# Patient Record
Sex: FEMALE | HISPANIC OR LATINO | URBAN - METROPOLITAN AREA
[De-identification: names, ages, dates, MRNs, and addresses within clinical notes are randomized per-mention and may not be internally consistent; named-entity substitution may affect disease eponyms.]

---

## 2017-12-15 ENCOUNTER — APPOINTMENT (RX ONLY)
Dept: URBAN - METROPOLITAN AREA CLINIC 12 | Facility: CLINIC | Age: 32
Setting detail: DERMATOLOGY
End: 2017-12-15

## 2017-12-15 PROCEDURE — 99212 OFFICE O/P EST SF 10 MIN: CPT

## 2017-12-22 ENCOUNTER — APPOINTMENT (RX ONLY)
Dept: URBAN - METROPOLITAN AREA CLINIC 12 | Facility: CLINIC | Age: 32
Setting detail: DERMATOLOGY
End: 2017-12-22

## 2017-12-22 DIAGNOSIS — I83.9 ASYMPTOMATIC VARICOSE VEINS OF LOWER EXTREMITIES: ICD-10-CM

## 2017-12-22 PROBLEM — I83.93 ASYMPTOMATIC VARICOSE VEINS OF BILATERAL LOWER EXTREMITIES: Status: ACTIVE | Noted: 2017-12-22

## 2017-12-22 PROCEDURE — ? SCLEROTHERAPY (COSMETIC)

## 2017-12-22 ASSESSMENT — LOCATION SIMPLE DESCRIPTION DERM
LOCATION SIMPLE: RIGHT PRETIBIAL REGION
LOCATION SIMPLE: RIGHT THIGH
LOCATION SIMPLE: LEFT PRETIBIAL REGION

## 2017-12-22 ASSESSMENT — LOCATION DETAILED DESCRIPTION DERM
LOCATION DETAILED: RIGHT ANTERIOR DISTAL THIGH
LOCATION DETAILED: RIGHT ANTERIOR PROXIMAL THIGH
LOCATION DETAILED: LEFT PROXIMAL PRETIBIAL REGION
LOCATION DETAILED: RIGHT PROXIMAL PRETIBIAL REGION

## 2017-12-22 ASSESSMENT — LOCATION ZONE DERM: LOCATION ZONE: LEG

## 2017-12-22 NOTE — PROCEDURE: SCLEROTHERAPY (COSMETIC)
Sclerosant Volume (Cc): 10
Disposition: Compression stockings and short stretch elastic bandages were placed postoperatively.
Price (Use Numbers Only, No Special Characters Or $): 100
Post-Care Instructions: Compression for 3 weeks is critical to optimize your recovery and results. Compliance with compression helps to prevent blood clots and minimizes pain, swelling, bruising, skin discoloration (staining) and the recurrence of vessels.       \nMaterials to gather for your wound care (all available over the counter)      \nCompression stockings x 2 pairs, 4 x 4 gauze, Comprilan wrap: 8 cm and 10 cm width wrap, Medical adhesive tape       \nCompression and Wound care;  Leg compression for 3 weeks is rea to your success and safety. Compression at night is only needed the first day. After that, compression is needed only during waking hours.  However, if your leg feels better with compression at night, then you may continue compression at night as tolerated.       \nAfter the sclerotherapy procedure, 2 layers compression will be placed.       \n1. On the skin, folded or flat gauze will cover the treated areas.       \n2. A compression wrap (Comprilan) will be wrapped around your leg over the gauze. Once the compression wrap is in place, a compression stocking will be worn. This two layer  compression (wrap plus stocking) should be worn for the first 24 hours if tolerated.       \n3. After 24 hours, remove all compressions and dressings and just wear the compression stockings only during waking hours.        \nYou will need to wear compression stockings for three weeks after your procedure, unless your physician instructs you otherwise.       \nActivity       \n - It is important NOT to be sitting or lying down for several hours after surgery. You may begin walking immediately after surgery. This is good for you, but take it easy.       \n - For 2 days after treatment: Avoid aerobic exercise, weight training, and all other types of exertion that increase your breathing and pulse rates.       \n - Do not get a tan for one month after sclerotherapy. Tanning increases your risk of skin discoloration.      \nBathing       \nAfter 24 hours, you may shower or bathe in tepid water, but keep the compression stocking on. Avoid immersion in hot tubs.      \nDiscomfort . For pain or discomfort:       \n - You may take acetaminophen (TylenolTM, Extra-Strength TylenolTM or DatrilTM) as directed.       \n - Do not use aspirin, products containing aspirin, or ibuprofen (for example AdvilTM or MotrinTM) for five days after your surgery, unless approved by your physician.       \n - Dietary restrictions Do not drink alcoholic beverages for two days after your sclerotherapy procedure.       \nPossible side effects following sclerotherapy  After sclerotherapy, mild swelling is expected. The injection sites may also become bruised or gray. You may also experience one or more of the following side effects, which almost always go away within one to four months:       \n - Darkening of the injected veins       \n - Brownish staining of the skin       \n - Small clotted vessels under the surface of the skin that you can feel      \n - Bruising of the injection sites       \nWhat to do about bruising  This will resolve within 2-3 weeks. If you wish the bruising to disappear sooner, then applying Arnicare cream (over the counter, health food stores) will help.       \nWhat to do if you feel small clotted vessels under the surface of the skin.      \n - Call us for a follow up appointment. These small clots can be drained through a small nick.       \n - Draining these small clots will help you heal faster and with less discoloration.      \nWhen to contact your physician       \nContact your physician if you experience any of the following:       \n - Treated areas become increasingly sore, tender, red or warm       \n - Acetaminophen does not relieve your discomfort       \n - Injection sites turn black or the skin around the site breaks down       \n - Ulceration of the injection sites       \n - You develop blisters from the tape       \n - You develop significant swelling or pain in the leg       \n - Darkening of large areas of the skin or foot
Consent was obtained with risks, benefits, and alternatives discussed for the above procedures.  Photographs were taken.
Detail Level: Detailed

## 2018-04-03 ENCOUNTER — APPOINTMENT (RX ONLY)
Dept: URBAN - METROPOLITAN AREA CLINIC 12 | Facility: CLINIC | Age: 33
Setting detail: DERMATOLOGY
End: 2018-04-03

## 2018-04-03 DIAGNOSIS — I83.9 ASYMPTOMATIC VARICOSE VEINS OF LOWER EXTREMITIES: ICD-10-CM

## 2018-04-03 PROBLEM — I83.93 ASYMPTOMATIC VARICOSE VEINS OF BILATERAL LOWER EXTREMITIES: Status: ACTIVE | Noted: 2018-04-03

## 2018-04-03 PROCEDURE — ? SCLEROTHERAPY (COSMETIC)

## 2018-04-03 ASSESSMENT — LOCATION SIMPLE DESCRIPTION DERM
LOCATION SIMPLE: RIGHT THIGH
LOCATION SIMPLE: LEFT KNEE
LOCATION SIMPLE: LEFT THIGH
LOCATION SIMPLE: RIGHT LOWER LEG

## 2018-04-03 ASSESSMENT — LOCATION ZONE DERM: LOCATION ZONE: LEG

## 2018-04-03 ASSESSMENT — LOCATION DETAILED DESCRIPTION DERM
LOCATION DETAILED: LEFT ANTERIOR DISTAL THIGH
LOCATION DETAILED: RIGHT POSTERIOR LATERAL DISTAL THIGH
LOCATION DETAILED: RIGHT LATERAL PROXIMAL CALF
LOCATION DETAILED: LEFT KNEE

## 2018-10-29 ENCOUNTER — APPOINTMENT (RX ONLY)
Dept: URBAN - METROPOLITAN AREA CLINIC 12 | Facility: CLINIC | Age: 33
Setting detail: DERMATOLOGY
End: 2018-10-29

## 2018-10-29 DIAGNOSIS — Z41.1 ENCOUNTER FOR COSMETIC SURGERY: ICD-10-CM

## 2018-10-29 DIAGNOSIS — E88.1 LIPODYSTROPHY, NOT ELSEWHERE CLASSIFIED: ICD-10-CM

## 2018-10-29 PROCEDURE — ? OTHER

## 2018-10-29 PROCEDURE — ? CONSULTATION - LIPOSUCTION

## 2018-10-29 PROCEDURE — ? CONSULTATION - BREAST (COSMETIC)

## 2018-10-29 PROCEDURE — ? PATIENT SPECIFIC COUNSELING

## 2018-10-29 ASSESSMENT — LOCATION DETAILED DESCRIPTION DERM
LOCATION DETAILED: RIGHT PROXIMAL POSTERIOR THIGH
LOCATION DETAILED: LEFT PROXIMAL POSTERIOR THIGH

## 2018-10-29 ASSESSMENT — LOCATION ZONE DERM: LOCATION ZONE: LEG

## 2018-10-29 ASSESSMENT — LOCATION SIMPLE DESCRIPTION DERM
LOCATION SIMPLE: RIGHT POSTERIOR THIGH
LOCATION SIMPLE: LEFT POSTERIOR THIGH

## 2018-10-29 NOTE — HPI: BREAST (AESTHETIC DEFORMITY, BREAST)
Are You Contemplating Future Pregnancy?: No
Which Breast(S) Are You Concerned About?: bilateral breasts
Which Side(S)?: both breasts
How Severe Are Your Concerns?: moderate
Is This A New Presentation, Or A Follow-Up?: Breast (Aesthetic Breast Deformity)
Referral Source: Dr. Jeff khan
How Many Pregnancies Have You Had (Female Patients)?: 2

## 2018-10-29 NOTE — PROCEDURE: PATIENT SPECIFIC COUNSELING
Other (Free Text): Examined patients breasts, her implants are sub muscular.\\nPatient states that she does not like bulging on the sides of her breasts, from the implants. Explained to patient that a high profile, a narrower and taller implant can be placed, to correct the bulging. \\nAlso discussed with patient about placing a Meso graft, to help breast stay lifted. \\nPatient states that she wants to keep the same 400cc size.\\nDiscussed with patient that the same incisions will be used. Also explained to patient that she would also benefit from a small breast lift.
Detail Level: Detailed
Other (Free Text): Examined patients posterior thighs. Patient has bilateral banana rolls under her buttocks.\\nDiscussed with patient that liposuction can be done to treat areas. \\nPatient should expect some bruising and swelling. Also discussed with patient that she will wear bicycle shorts to help her thighs take shape.

## 2018-12-18 ENCOUNTER — APPOINTMENT (RX ONLY)
Dept: URBAN - METROPOLITAN AREA CLINIC 12 | Facility: CLINIC | Age: 33
Setting detail: DERMATOLOGY
End: 2018-12-18

## 2018-12-18 DIAGNOSIS — E88.1 LIPODYSTROPHY, NOT ELSEWHERE CLASSIFIED: ICD-10-CM

## 2018-12-18 DIAGNOSIS — Z41.1 ENCOUNTER FOR COSMETIC SURGERY: ICD-10-CM

## 2018-12-18 PROCEDURE — ? NO CHARGE PRE-OP VISIT

## 2018-12-18 PROCEDURE — ? PRE-OP WORKLIST

## 2018-12-18 PROCEDURE — 99024 POSTOP FOLLOW-UP VISIT: CPT

## 2018-12-18 ASSESSMENT — LOCATION DETAILED DESCRIPTION DERM
LOCATION DETAILED: RIGHT ANTERIOR LATERAL PROXIMAL THIGH
LOCATION DETAILED: RIGHT PROXIMAL POSTERIOR THIGH
LOCATION DETAILED: LEFT PROXIMAL POSTERIOR THIGH
LOCATION DETAILED: LEFT MEDIAL BREAST 8-9:00 REGION
LOCATION DETAILED: LEFT MEDIAL BREAST 9-10:00 REGION
LOCATION DETAILED: RIGHT MEDIAL BREAST 3-4:00 REGION
LOCATION DETAILED: LEFT ANTERIOR LATERAL PROXIMAL THIGH
LOCATION DETAILED: RIGHT MEDIAL BREAST 2-3:00 REGION

## 2018-12-18 ASSESSMENT — LOCATION SIMPLE DESCRIPTION DERM
LOCATION SIMPLE: RIGHT POSTERIOR THIGH
LOCATION SIMPLE: RIGHT THIGH
LOCATION SIMPLE: RIGHT BREAST
LOCATION SIMPLE: LEFT THIGH
LOCATION SIMPLE: LEFT BREAST
LOCATION SIMPLE: LEFT POSTERIOR THIGH

## 2018-12-18 ASSESSMENT — LOCATION ZONE DERM
LOCATION ZONE: TRUNK
LOCATION ZONE: LEG

## 2018-12-18 NOTE — PROCEDURE: PRE-OP WORKLIST
Surgeon: Dr. Sal
Surgery Scheduled: Liposuction Bananna rolls and outer thighs, mastopexy , breast implant exchange.
Date Of Surgery: 12/27/2018
Detail Level: Simple

## 2018-12-18 NOTE — PROCEDURE: NO CHARGE PRE-OP VISIT
For Tracking Purposes Only. Note 96807 Is Retired Code. Use 02082 Or 88388?: 04207
Detail Level: Detailed

## 2019-01-07 ENCOUNTER — APPOINTMENT (RX ONLY)
Dept: URBAN - METROPOLITAN AREA CLINIC 12 | Facility: CLINIC | Age: 34
Setting detail: DERMATOLOGY
End: 2019-01-07

## 2019-01-07 DIAGNOSIS — Z48.89 ENCOUNTER FOR OTHER SPECIFIED SURGICAL AFTERCARE: ICD-10-CM

## 2019-01-07 PROCEDURE — ? COUNSELING - POST-OP CHECK, LIPOSUCTION

## 2019-01-07 PROCEDURE — ? COUNSELING - POST-OP CHECK, BREAST AUGMENTATION

## 2019-01-07 PROCEDURE — 99024 POSTOP FOLLOW-UP VISIT: CPT

## 2019-01-07 PROCEDURE — ? POST-OP CHECK

## 2019-01-07 PROCEDURE — ? PATIENT SPECIFIC COUNSELING

## 2019-01-07 ASSESSMENT — LOCATION ZONE DERM
LOCATION ZONE: LEG
LOCATION ZONE: TRUNK

## 2019-01-07 ASSESSMENT — LOCATION DETAILED DESCRIPTION DERM
LOCATION DETAILED: LEFT ANTERIOR LATERAL PROXIMAL THIGH
LOCATION DETAILED: RIGHT PERIAREOLAR BREAST 6-7:00 REGION
LOCATION DETAILED: LEFT PERIAREOLAR BREAST 5-6:00 REGION
LOCATION DETAILED: RIGHT ANTERIOR PROXIMAL THIGH
LOCATION DETAILED: LEFT ANTERIOR PROXIMAL THIGH
LOCATION DETAILED: LEFT LATERAL BREAST 5-6:00 REGION
LOCATION DETAILED: RIGHT ANTERIOR MEDIAL PROXIMAL THIGH

## 2019-01-07 ASSESSMENT — LOCATION SIMPLE DESCRIPTION DERM
LOCATION SIMPLE: RIGHT THIGH
LOCATION SIMPLE: RIGHT BREAST
LOCATION SIMPLE: LEFT THIGH
LOCATION SIMPLE: LEFT BREAST

## 2019-01-07 NOTE — PROCEDURE: PATIENT SPECIFIC COUNSELING
Detail Level: Zone
Other (Free Text): Patient presents s/p breast augmentation with liposuction to inner and outer thighs, performed 12/27/2018 patient states she’s doing well with no concerns. Dr. Sal examined patient and informed her that everything looks great and incision are healing very well. Dr. Sal informed patient that he liposuction site sutures are ready for removal , as well as a few sutures around her nipples tan are ready to be removed . Patient was educated on the use of paper tape to help with scar fading.

## 2019-01-21 ENCOUNTER — APPOINTMENT (RX ONLY)
Dept: URBAN - METROPOLITAN AREA CLINIC 12 | Facility: CLINIC | Age: 34
Setting detail: DERMATOLOGY
End: 2019-01-21

## 2019-01-21 DIAGNOSIS — Z48.89 ENCOUNTER FOR OTHER SPECIFIED SURGICAL AFTERCARE: ICD-10-CM

## 2019-01-21 PROCEDURE — 99024 POSTOP FOLLOW-UP VISIT: CPT

## 2019-01-21 PROCEDURE — ? POST-OP CHECK

## 2019-01-21 PROCEDURE — ? COUNSELING - POST-OP CHECK, BREAST AUGMENTATION

## 2019-01-21 PROCEDURE — ? COUNSELING - POST-OP CHECK, LIPOSUCTION

## 2019-01-21 PROCEDURE — ? PATIENT SPECIFIC COUNSELING

## 2019-01-21 ASSESSMENT — LOCATION DETAILED DESCRIPTION DERM
LOCATION DETAILED: LEFT ANTERIOR PROXIMAL THIGH
LOCATION DETAILED: RIGHT ANTERIOR MEDIAL PROXIMAL THIGH
LOCATION DETAILED: LEFT LATERAL BREAST 5-6:00 REGION
LOCATION DETAILED: LEFT PERIAREOLAR BREAST 5-6:00 REGION
LOCATION DETAILED: RIGHT ANTERIOR PROXIMAL THIGH
LOCATION DETAILED: RIGHT PERIAREOLAR BREAST 6-7:00 REGION
LOCATION DETAILED: LEFT ANTERIOR LATERAL PROXIMAL THIGH

## 2019-01-21 ASSESSMENT — LOCATION SIMPLE DESCRIPTION DERM
LOCATION SIMPLE: LEFT THIGH
LOCATION SIMPLE: RIGHT BREAST
LOCATION SIMPLE: RIGHT THIGH
LOCATION SIMPLE: LEFT BREAST

## 2019-01-21 ASSESSMENT — LOCATION ZONE DERM
LOCATION ZONE: LEG
LOCATION ZONE: TRUNK

## 2019-03-08 ENCOUNTER — APPOINTMENT (RX ONLY)
Dept: URBAN - METROPOLITAN AREA CLINIC 12 | Facility: CLINIC | Age: 34
Setting detail: DERMATOLOGY
End: 2019-03-08

## 2019-03-08 DIAGNOSIS — Z48.89 ENCOUNTER FOR OTHER SPECIFIED SURGICAL AFTERCARE: ICD-10-CM

## 2019-03-08 PROCEDURE — ? POST-OP CHECK

## 2019-03-08 PROCEDURE — ? COUNSELING - POST-OP CHECK, BREAST AUGMENTATION

## 2019-03-08 PROCEDURE — ? PATIENT SPECIFIC COUNSELING

## 2019-03-08 PROCEDURE — ? COUNSELING - POST-OP CHECK, LIPOSUCTION

## 2019-03-08 PROCEDURE — 99024 POSTOP FOLLOW-UP VISIT: CPT

## 2019-03-08 ASSESSMENT — LOCATION DETAILED DESCRIPTION DERM
LOCATION DETAILED: LEFT ANTERIOR LATERAL PROXIMAL THIGH
LOCATION DETAILED: LEFT ANTERIOR PROXIMAL THIGH
LOCATION DETAILED: RIGHT ANTERIOR MEDIAL PROXIMAL THIGH
LOCATION DETAILED: RIGHT PERIAREOLAR BREAST 6-7:00 REGION
LOCATION DETAILED: RIGHT ANTERIOR PROXIMAL THIGH
LOCATION DETAILED: LEFT LATERAL BREAST 5-6:00 REGION
LOCATION DETAILED: LEFT PERIAREOLAR BREAST 5-6:00 REGION

## 2019-03-08 ASSESSMENT — LOCATION ZONE DERM
LOCATION ZONE: LEG
LOCATION ZONE: TRUNK

## 2019-03-08 ASSESSMENT — LOCATION SIMPLE DESCRIPTION DERM
LOCATION SIMPLE: LEFT BREAST
LOCATION SIMPLE: LEFT THIGH
LOCATION SIMPLE: RIGHT THIGH
LOCATION SIMPLE: RIGHT BREAST

## 2019-03-08 NOTE — PROCEDURE: PATIENT SPECIFIC COUNSELING
Detail Level: Simple
Other (Free Text): Pt is concerned about flattened area on the lower outer quadrant of left breast. Advised patient that we can do a revision on the left breast. Need to wait until 3-4 months out to see what naturally occurs. Marie will call to give her a quote. \\n\\nPatient is also concerned about lack of tightness of breasts, wishes they sat higher on her chest. Advised pt that some amount of skin laxity is unavoidable due to decreased skin elasticity; attempting to correct or take more skin out would likely cause repeat of same problem or a new problem.

## 2019-04-16 ENCOUNTER — APPOINTMENT (RX ONLY)
Dept: URBAN - METROPOLITAN AREA CLINIC 12 | Facility: CLINIC | Age: 34
Setting detail: DERMATOLOGY
End: 2019-04-16

## 2019-04-16 DIAGNOSIS — Z48.89 ENCOUNTER FOR OTHER SPECIFIED SURGICAL AFTERCARE: ICD-10-CM

## 2019-04-16 PROCEDURE — 99024 POSTOP FOLLOW-UP VISIT: CPT

## 2019-04-16 PROCEDURE — ? COUNSELING - POST-OP CHECK, LIPOSUCTION

## 2019-04-16 PROCEDURE — ? COUNSELING - POST-OP CHECK, BREAST AUGMENTATION

## 2019-04-16 PROCEDURE — ? POST-OP CHECK

## 2019-04-16 PROCEDURE — ? PATIENT SPECIFIC COUNSELING

## 2019-04-16 ASSESSMENT — LOCATION DETAILED DESCRIPTION DERM
LOCATION DETAILED: LEFT PERIAREOLAR BREAST 5-6:00 REGION
LOCATION DETAILED: RIGHT ANTERIOR PROXIMAL THIGH
LOCATION DETAILED: RIGHT PERIAREOLAR BREAST 6-7:00 REGION
LOCATION DETAILED: LEFT ANTERIOR LATERAL PROXIMAL THIGH
LOCATION DETAILED: LEFT LATERAL BREAST 5-6:00 REGION
LOCATION DETAILED: LEFT ANTERIOR PROXIMAL THIGH
LOCATION DETAILED: RIGHT ANTERIOR MEDIAL PROXIMAL THIGH

## 2019-04-16 ASSESSMENT — LOCATION ZONE DERM
LOCATION ZONE: TRUNK
LOCATION ZONE: LEG

## 2019-04-16 NOTE — PROCEDURE: PATIENT SPECIFIC COUNSELING
Detail Level: Simple
Other (Free Text): Patient presents s/p breast augmentation and liposuction. Dr. Sal examined her breast and explained  that everything looks great, however Dr. Sal suggested fixing her L breast where it’s become flat. Patient mention that she recently just started a new job and and would like to wait until she can get the time off.

## 2021-05-17 NOTE — PROCEDURE: COUNSELING - POST-OP CHECK, LIPOSUCTION
Writer contacted ED provider to inform of 30 day readmission risk.      Pt waiting on bed
Add Postop Global No-Charge Code (73080)?: yes
Detail Level: Simple

## 2021-06-16 ENCOUNTER — APPOINTMENT (RX ONLY)
Dept: URBAN - METROPOLITAN AREA OTHER 9 | Facility: OTHER | Age: 36
Setting detail: DERMATOLOGY
End: 2021-06-16

## 2021-06-16 DIAGNOSIS — I83.9 ASYMPTOMATIC VARICOSE VEINS OF LOWER EXTREMITIES: ICD-10-CM

## 2021-06-16 PROBLEM — I83.93 ASYMPTOMATIC VARICOSE VEINS OF BILATERAL LOWER EXTREMITIES: Status: ACTIVE | Noted: 2021-06-16

## 2021-06-16 PROCEDURE — ? COSMETIC CONSULTATION: SCLEROTHERAPY

## 2021-06-16 PROCEDURE — ? OTHER

## 2021-06-16 PROCEDURE — ? COUNSELING

## 2021-06-16 ASSESSMENT — LOCATION DETAILED DESCRIPTION DERM
LOCATION DETAILED: LEFT PROXIMAL PRETIBIAL REGION
LOCATION DETAILED: LEFT ANTERIOR DISTAL THIGH
LOCATION DETAILED: RIGHT MEDIAL PROXIMAL PRETIBIAL REGION
LOCATION DETAILED: RIGHT ANTERIOR DISTAL THIGH

## 2021-06-16 ASSESSMENT — LOCATION SIMPLE DESCRIPTION DERM
LOCATION SIMPLE: RIGHT THIGH
LOCATION SIMPLE: RIGHT PRETIBIAL REGION
LOCATION SIMPLE: LEFT PRETIBIAL REGION
LOCATION SIMPLE: LEFT THIGH

## 2021-06-16 ASSESSMENT — LOCATION ZONE DERM: LOCATION ZONE: LEG

## 2021-06-16 NOTE — PROCEDURE: OTHER
Chief complaint:   Chief Complaint   Patient presents with   • Medicare Wellness Visit     welcome   • Follow-up       Vitals:  Visit Vitals  /70 ([place] [facility] [place])   Pulse 60   Ht 5' 4\" (1.626 m)   Wt 68.5 kg   BMI 25.92 kg/m²       HISTORY OF PRESENT ILLNESS     HPI    Other significant problems:  Patient Active Problem List    Diagnosis Date Noted   • S/P ORIF (open reduction internal fixation) sternal fracture 06/29/2018     Priority: Low   • Closed fracture of body of sternum with delayed healing 06/05/2018     Priority: Low   • Peripheral edema 06/09/2017     Priority: Low   • Anxiety and depression 06/09/2017     Priority: Low   • Nicotine abuse 06/09/2017     Priority: Low   • Chronic diarrhea 06/09/2017     Priority: Low   • Clostridium difficile infection 04/07/2017     Priority: Low   • Benign essential HTN 12/13/2016     Priority: Low   • CAD in native artery 08/10/2016     Priority: Low   • Pulmonary hypertension (CMS/[facility]) 01/26/2015     Priority: Low   • PNA (pneumonia) 08/15/2014     Priority: Low   • Respiratory insufficiency 08/15/2014     Priority: Low   • Rheumatoid arthritis (CMS/[facility]) 08/16/2013     Priority: Low       PAST MEDICAL, FAMILY AND SOCIAL HISTORY     Medications:  Current Outpatient Medications   Medication   • NARCAN 4 MG/0.1ML nasal spray   • gabapentin (NEURONTIN) 100 MG capsule   • rotigotine (NEUPRO) 2 MG/24HR patch   • clopidogrel (PLAVIX) 75 MG tablet   • metoPROLOL succinate (TOPROL-XL) 25 MG 24 hr tablet   • venlafaxine XR (EFFEXOR XR) 37.5 MG 24 hr capsule   • pantoprazole (PROTONIX) 40 MG tablet   • simvastatin (ZOCOR) 40 MG tablet   • fluticasone-salmeterol (ADVAIR DISKUS) 250-50 MCG/DOSE inhaler   • potassium chloride (KLOR-CON 10) 10 MEQ ER tablet   • alendronate (FOSAMAX) 70 MG tablet   • losartan (COZAAR) 100 MG tablet   • pramipexole (MIRAPEX) 0.25 MG tablet   • tiZANidine (ZANAFLEX) 2 MG tablet   • magnesium oxide 250 MG tablet   • 
bumetanide (BUMEX) 1 MG tablet   • HYDROcodone-acetaminophen (NORCO) 5-325 MG per tablet   • Calcium Carb-Cholecalciferol (CALCIUM 600/VITAMIN D) 600-400 MG-UNIT Tab   • tofacitinib (XELJANZ) 5 MG tablet   • ciclopirox (PENLAC) 8 % solution   • aspirin 81 MG tablet   • lidocaine (LIDODERM) 5 % patch   • hydroxychloroquine (PLAQUENIL) 200 MG tablet   • hydrALAZINE (APRESOLINE) 25 MG tablet   • folic acid (FOLATE) 1 MG tablet     No current facility-administered medications for this visit.        Allergies:  ALLERGIES:   Allergen Reactions   • Latex RASH   • Trace Metals RASH       Past Medical  History/Surgeries:  Past Medical History:   Diagnosis Date   • Anemia    • ANEMIA IRON DEF, CHR BLOOD LOSS    • Anxiety    • Arthritis     rheumatoid arthritis   • Benign neoplasm of adrenal gland     right   • Bilateral edema of lower extremity    • Chronic anticoagulation 06/2018    Takes clopidogrel - to stop pre-op on 6/24/18   • Chronic diarrhea    • Chronic pain    • Clostridium difficile infection 4/7/2017   • Congestive Heart Failure    • COPD (chronic obstructive pulmonary disease) (CMS/[facility])    • Coronary Artery Disease     MI 3/2010   • Depression    • Fibromyalgia    • Fracture, tibia 11/10/2017    nondisplaced right distal tibia. Due to fall   • Gastroesophageal reflux disease    • Hyperlipidemia    • Hypertension    • Myocardial infarction (CMS/[facility]) 2009   • Osteoporosis    • Pneumonia    • Requires assistance with activities of daily living (ADL) 06/2018   • Rheumatoid arthritis(714.0)    • S/P ORIF (open reduction internal fixation) sternal fracture 6/29/2018   • Sinusitis, chronic    • Sleep apnea     cpap   • Urinary incontinence    • Wears dentures    • Wears glasses        Past Surgical History:   Procedure Laterality Date   • Ankle scope,plantar fasciotomy     • Bronchoscopy  01/2019   • Colonoscopy diagnostic  01/01/2007    no significant findings   • Colonoscopy diagnostic  01/01/2015   • Colonoscopy 
diagnostic  10/03/2017    10yr recall    • Correct bunion      bilateral   • Esophagogastroduodenoscopy  10/03/2017    gastritis   • Gynecologic cryosurgery     • Hysterectomy     • Insert intracoronary stent  03/01/2010    Stent, Single   • Laparoscopy,tubal cautery      Tubal Ligation   • Ptca     • Repair of sternum separation  06/29/2018    ORIF (open reduction internal fixation) sternal fracture with Dr. Cecil Obrien on 6/29/2018 at [facility].   • Service to gastroenterology     • Thoracoscopy surg wedg resec lung  8/14/14    RVATS, extensive SKIP, wedge resection of upper and lower lobe   • Total abdom hysterectomy      w/ BSO, for cervical dysplasi, uterine bleeding   • Vascular surgery         Family History:  Family History   Problem Relation Age of Onset   • Heart Mother    • Diabetes Mother    • Alcohol Abuse Father         alcoholism   • Diabetes Sister    • Heart disease Sister    • High blood pressure Sister    • Allergies Sister    • High blood pressure Brother    • Diabetes Brother    • High blood pressure Brother    • High blood pressure Brother    • Diabetes Brother    • Kidney disease Brother    • Liver Disease Brother        Social History:  Social History     Tobacco Use   • Smoking status: Light Tobacco Smoker     Packs/day: 0.25     Types: Cigarettes   • Smokeless tobacco: Never Used   • Tobacco comment: quit info previously given smokes 3-4 cigarettes per day   Substance Use Topics   • Alcohol use: Yes     Comment: socially       REVIEW OF SYSTEMS     Review of Systems    PHYSICAL EXAM     Physical Exam    ASSESSMENT/PLAN     This office note has been dictated.  
Other (Free Text): Dr. Null examined patient and recommended sclerotherapy. He plans to preform procedure later this fall when patient has less of a tan. He plans to treat blue vessels first then finer ones after.\\nHe wrote a prescription for thigh high compression stockings (20-30 mmhg) which patient is to bring to sclerotherapy appointment. He explained to the patient that the procedure will take some time to preform so scheduling must be done accurately.\\nHe explained exercises that engage the calf improve the veins (walking) while sitting for long periods of time is detrimental.
Render Risk Assessment In Note?: no
Note Text (......Xxx Chief Complaint.): This diagnosis correlates with the
Detail Level: Zone

## 2022-06-17 ENCOUNTER — APPOINTMENT (RX ONLY)
Dept: URBAN - METROPOLITAN AREA CLINIC 12 | Facility: CLINIC | Age: 37
Setting detail: DERMATOLOGY
End: 2022-06-17

## 2022-06-17 DIAGNOSIS — Z41.9 ENCOUNTER FOR PROCEDURE FOR PURPOSES OTHER THAN REMEDYING HEALTH STATE, UNSPECIFIED: ICD-10-CM

## 2022-06-17 PROCEDURE — ? CONSULTATION - COOLSCULPTING

## 2022-06-17 ASSESSMENT — LOCATION DETAILED DESCRIPTION DERM
LOCATION DETAILED: LEFT INFERIOR CENTRAL MALAR CHEEK
LOCATION DETAILED: RIGHT INFERIOR CENTRAL MALAR CHEEK
LOCATION DETAILED: RIGHT MEDIAL FOREHEAD
LOCATION DETAILED: PERIUMBILICAL SKIN
LOCATION DETAILED: LEFT CHIN

## 2022-06-17 ASSESSMENT — LOCATION ZONE DERM
LOCATION ZONE: FACE
LOCATION ZONE: TRUNK

## 2022-06-17 ASSESSMENT — LOCATION SIMPLE DESCRIPTION DERM
LOCATION SIMPLE: RIGHT FOREHEAD
LOCATION SIMPLE: RIGHT CHEEK
LOCATION SIMPLE: ABDOMEN
LOCATION SIMPLE: CHIN
LOCATION SIMPLE: LEFT CHEEK

## 2022-07-06 ENCOUNTER — APPOINTMENT (RX ONLY)
Dept: URBAN - METROPOLITAN AREA CLINIC 12 | Facility: CLINIC | Age: 37
Setting detail: DERMATOLOGY
End: 2022-07-06

## 2022-07-06 DIAGNOSIS — Z41.9 ENCOUNTER FOR PROCEDURE FOR PURPOSES OTHER THAN REMEDYING HEALTH STATE, UNSPECIFIED: ICD-10-CM

## 2022-07-06 PROCEDURE — ? HYDRAFACIAL

## 2022-07-06 ASSESSMENT — LOCATION DETAILED DESCRIPTION DERM
LOCATION DETAILED: LEFT CHIN
LOCATION DETAILED: RIGHT INFERIOR MEDIAL MALAR CHEEK
LOCATION DETAILED: LEFT MEDIAL FOREHEAD
LOCATION DETAILED: LEFT INFERIOR CENTRAL MALAR CHEEK
LOCATION DETAILED: LEFT INFERIOR ANTERIOR NECK

## 2022-07-06 ASSESSMENT — LOCATION SIMPLE DESCRIPTION DERM
LOCATION SIMPLE: CHIN
LOCATION SIMPLE: LEFT ANTERIOR NECK
LOCATION SIMPLE: LEFT CHEEK
LOCATION SIMPLE: LEFT FOREHEAD
LOCATION SIMPLE: RIGHT CHEEK

## 2022-07-06 ASSESSMENT — LOCATION ZONE DERM
LOCATION ZONE: FACE
LOCATION ZONE: NECK

## 2022-07-06 NOTE — PROCEDURE: HYDRAFACIAL
Post-Care Instructions: I reviewed with the patient in detail post-care instructions. Patient should stay away from the sun and wear sun protection until treated areas are fully healed.
Treatment Number (If Set To 0 Will Not Render): 1
Number Of Passes: 3
Detail Level: Zone
Price $ (Use Numbers Only, No Text Please.): 295
Vacuum Pressure: 12
Consent: Written consent obtained, risks reviewed including but not limited to crusting, scabbing, blistering, scarring, darker or lighter pigmentary change, bruising, and/or incomplete response.

## 2023-04-07 ENCOUNTER — APPOINTMENT (RX ONLY)
Dept: URBAN - METROPOLITAN AREA CLINIC 12 | Facility: CLINIC | Age: 38
Setting detail: DERMATOLOGY
End: 2023-04-07

## 2023-04-07 DIAGNOSIS — Z41.9 ENCOUNTER FOR PROCEDURE FOR PURPOSES OTHER THAN REMEDYING HEALTH STATE, UNSPECIFIED: ICD-10-CM

## 2023-04-07 PROCEDURE — ? COOLSCULPTING

## 2023-04-07 ASSESSMENT — LOCATION DETAILED DESCRIPTION DERM: LOCATION DETAILED: PERIUMBILICAL SKIN

## 2023-04-07 ASSESSMENT — LOCATION SIMPLE DESCRIPTION DERM: LOCATION SIMPLE: ABDOMEN

## 2023-04-07 ASSESSMENT — LOCATION ZONE DERM: LOCATION ZONE: TRUNK

## 2023-04-07 NOTE — PROCEDURE: COOLSCULPTING
Suction Settings: The suction settings were per protocol.
Time (Minutes - Will Only Render If Nonzero): 0
Applicator Size: standard applicator
Treatment Administered By (Optional): Sebastian Barbour
Number Of Cycles: 1
Intro: Prior to treatment, the area was cleaned with alcohol and marked out with a marking pen. The gel sheet was then applied uniformly. The applicator was applied to the skin with good contact and suction.
Device: Coolsculpting
Post Treatment: After treatment, the suction was turned off, and the applicator was removed from the skin.
Price (Use Numbers Only, No Special Characters Or $): 7492
Consent: Written consent obtained, risks reviewed including, but not limited to, blistering from suction, darker or lighter pigmentary change, bruising, and/or need for multiple treatments.
Detail Level: Zone

## 2023-04-14 ENCOUNTER — APPOINTMENT (RX ONLY)
Dept: URBAN - METROPOLITAN AREA CLINIC 12 | Facility: CLINIC | Age: 38
Setting detail: DERMATOLOGY
End: 2023-04-14

## 2023-04-14 DIAGNOSIS — Z41.9 ENCOUNTER FOR PROCEDURE FOR PURPOSES OTHER THAN REMEDYING HEALTH STATE, UNSPECIFIED: ICD-10-CM

## 2023-04-14 PROCEDURE — ? COOLSCULPTING

## 2023-04-14 ASSESSMENT — LOCATION SIMPLE DESCRIPTION DERM
LOCATION SIMPLE: LEFT THIGH
LOCATION SIMPLE: RIGHT THIGH

## 2023-04-14 ASSESSMENT — LOCATION DETAILED DESCRIPTION DERM
LOCATION DETAILED: RIGHT ANTERIOR PROXIMAL THIGH
LOCATION DETAILED: LEFT ANTERIOR PROXIMAL THIGH

## 2023-04-14 ASSESSMENT — LOCATION ZONE DERM: LOCATION ZONE: LEG

## 2023-04-14 NOTE — PROCEDURE: COOLSCULPTING
Time (Minutes - Will Only Render If Nonzero): 0
Number Of Cycles: 1
Applicator Size: standard applicator
Suction Settings: The suction settings were per protocol.
Consent: Written consent obtained, risks reviewed including, but not limited to, blistering from suction, darker or lighter pigmentary change, bruising, and/or need for multiple treatments.
Treatment Administered By (Optional): Sebastian Barbour
Device: Coolsculpting
Intro: Prior to treatment, the area was cleaned with alcohol and marked out with a marking pen. The gel sheet was then applied uniformly. The applicator was applied to the skin with good contact and suction.
Post Treatment: After treatment, the suction was turned off, and the applicator was removed from the skin.
Detail Level: Zone

## 2023-05-08 ENCOUNTER — APPOINTMENT (RX ONLY)
Dept: URBAN - METROPOLITAN AREA CLINIC 12 | Facility: CLINIC | Age: 38
Setting detail: DERMATOLOGY
End: 2023-05-08

## 2023-05-08 DIAGNOSIS — Z41.9 ENCOUNTER FOR PROCEDURE FOR PURPOSES OTHER THAN REMEDYING HEALTH STATE, UNSPECIFIED: ICD-10-CM

## 2023-05-08 PROCEDURE — ? DIAMONDGLOW

## 2023-05-08 ASSESSMENT — LOCATION DETAILED DESCRIPTION DERM
LOCATION DETAILED: RIGHT MEDIAL MALAR CHEEK
LOCATION DETAILED: LEFT SUPERIOR MEDIAL FOREHEAD
LOCATION DETAILED: LEFT CENTRAL MALAR CHEEK
LOCATION DETAILED: LEFT SUPERIOR ANTERIOR NECK
LOCATION DETAILED: LEFT CHIN

## 2023-05-08 ASSESSMENT — LOCATION SIMPLE DESCRIPTION DERM
LOCATION SIMPLE: LEFT CHEEK
LOCATION SIMPLE: CHIN
LOCATION SIMPLE: RIGHT CHEEK
LOCATION SIMPLE: LEFT FOREHEAD
LOCATION SIMPLE: LEFT ANTERIOR NECK

## 2023-05-08 ASSESSMENT — LOCATION ZONE DERM
LOCATION ZONE: NECK
LOCATION ZONE: FACE

## 2023-05-08 NOTE — PROCEDURE: DIAMONDGLOW
Price (Use Numbers Only, No Special Characters Or $): 147.50
Detail Level: Zone
Vacuum Pressure In Inches: 10
Body Treatment Head Used?: Not Used
Post-Care Instructions: I reviewed with the patient in detail post-care instructions. Patient should stay away from the sun and wear sun protection until treated areas are fully healed.
Solution Used: TNS Advanced
Consent: Written consent obtained, risks reviewed including but not limited to crusting, scabbing, blistering, scarring, darker or lighter pigmentary change, bruising, and/or incomplete response.
Number Of Passes: 4
Intelliflow Setting: 100%